# Patient Record
Sex: FEMALE | Race: OTHER | HISPANIC OR LATINO | URBAN - METROPOLITAN AREA
[De-identification: names, ages, dates, MRNs, and addresses within clinical notes are randomized per-mention and may not be internally consistent; named-entity substitution may affect disease eponyms.]

---

## 2023-09-21 ENCOUNTER — ANESTHESIA EVENT (OUTPATIENT)
Dept: SURGERY | Facility: HOSPITAL | Age: 43
End: 2023-09-21

## 2023-09-21 ENCOUNTER — HOSPITAL ENCOUNTER (OUTPATIENT)
Facility: HOSPITAL | Age: 43
Discharge: HOME | End: 2023-09-21
Attending: SURGERY | Admitting: SURGERY

## 2023-09-21 ENCOUNTER — ANESTHESIA (OUTPATIENT)
Dept: SURGERY | Facility: HOSPITAL | Age: 43
End: 2023-09-21

## 2023-09-21 VITALS
TEMPERATURE: 99 F | RESPIRATION RATE: 17 BRPM | WEIGHT: 162.06 LBS | SYSTOLIC BLOOD PRESSURE: 102 MMHG | OXYGEN SATURATION: 99 % | HEART RATE: 67 BPM | HEIGHT: 64 IN | DIASTOLIC BLOOD PRESSURE: 65 MMHG | BODY MASS INDEX: 27.67 KG/M2

## 2023-09-21 DIAGNOSIS — K35.80 ACUTE APPENDICITIS, UNSPECIFIED ACUTE APPENDICITIS TYPE: Primary | ICD-10-CM

## 2023-09-21 LAB
ALBUMIN SERPL BCP-MCNC: 4 G/DL (ref 3.5–5.2)
ALP SERPL-CCNC: 47 U/L (ref 55–135)
ALT SERPL W/O P-5'-P-CCNC: 20 U/L (ref 10–44)
ANION GAP SERPL CALC-SCNC: 13 MMOL/L (ref 8–16)
AST SERPL-CCNC: 18 U/L (ref 10–40)
B-HCG UR QL: NEGATIVE
BACTERIA #/AREA URNS HPF: NORMAL /HPF
BASOPHILS # BLD AUTO: 0.08 K/UL (ref 0–0.2)
BASOPHILS NFR BLD: 0.5 % (ref 0–1.9)
BILIRUB SERPL-MCNC: 0.8 MG/DL (ref 0.1–1)
BILIRUB UR QL STRIP: NEGATIVE
BUN SERPL-MCNC: 14 MG/DL (ref 6–20)
CALCIUM SERPL-MCNC: 9.1 MG/DL (ref 8.7–10.5)
CHLORIDE SERPL-SCNC: 107 MMOL/L (ref 95–110)
CLARITY UR: CLEAR
CO2 SERPL-SCNC: 15 MMOL/L (ref 23–29)
COLOR UR: YELLOW
CREAT SERPL-MCNC: 0.8 MG/DL (ref 0.5–1.4)
DIFFERENTIAL METHOD: ABNORMAL
EOSINOPHIL # BLD AUTO: 0.4 K/UL (ref 0–0.5)
EOSINOPHIL NFR BLD: 2.5 % (ref 0–8)
ERYTHROCYTE [DISTWIDTH] IN BLOOD BY AUTOMATED COUNT: 12.8 % (ref 11.5–14.5)
EST. GFR  (NO RACE VARIABLE): >60 ML/MIN/1.73 M^2
GLUCOSE SERPL-MCNC: 114 MG/DL (ref 70–110)
GLUCOSE UR QL STRIP: NEGATIVE
HCT VFR BLD AUTO: 38.6 % (ref 37–48.5)
HGB BLD-MCNC: 13.1 G/DL (ref 12–16)
HGB UR QL STRIP: ABNORMAL
IMM GRANULOCYTES # BLD AUTO: 0.06 K/UL (ref 0–0.04)
IMM GRANULOCYTES NFR BLD AUTO: 0.4 % (ref 0–0.5)
KETONES UR QL STRIP: ABNORMAL
LACTATE SERPL-SCNC: 0.9 MMOL/L (ref 0.5–2.2)
LEUKOCYTE ESTERASE UR QL STRIP: ABNORMAL
LIPASE SERPL-CCNC: 16 U/L (ref 4–60)
LYMPHOCYTES # BLD AUTO: 1.8 K/UL (ref 1–4.8)
LYMPHOCYTES NFR BLD: 12.3 % (ref 18–48)
MCH RBC QN AUTO: 29.4 PG (ref 27–31)
MCHC RBC AUTO-ENTMCNC: 33.9 G/DL (ref 32–36)
MCV RBC AUTO: 87 FL (ref 82–98)
MICROSCOPIC COMMENT: NORMAL
MONOCYTES # BLD AUTO: 1.3 K/UL (ref 0.3–1)
MONOCYTES NFR BLD: 8.6 % (ref 4–15)
NEUTROPHILS # BLD AUTO: 11.3 K/UL (ref 1.8–7.7)
NEUTROPHILS NFR BLD: 75.7 % (ref 38–73)
NITRITE UR QL STRIP: NEGATIVE
NRBC BLD-RTO: 0 /100 WBC
PH UR STRIP: 6 [PH] (ref 5–8)
PLATELET # BLD AUTO: 208 K/UL (ref 150–450)
PMV BLD AUTO: 10.8 FL (ref 9.2–12.9)
POTASSIUM SERPL-SCNC: 3.6 MMOL/L (ref 3.5–5.1)
PROT SERPL-MCNC: 7.2 G/DL (ref 6–8.4)
PROT UR QL STRIP: NEGATIVE
RBC # BLD AUTO: 4.46 M/UL (ref 4–5.4)
RBC #/AREA URNS HPF: 2 /HPF (ref 0–4)
SODIUM SERPL-SCNC: 135 MMOL/L (ref 136–145)
SP GR UR STRIP: >=1.03 (ref 1–1.03)
SQUAMOUS #/AREA URNS HPF: 1 /HPF
URN SPEC COLLECT METH UR: ABNORMAL
UROBILINOGEN UR STRIP-ACNC: NEGATIVE EU/DL
WBC # BLD AUTO: 14.96 K/UL (ref 3.9–12.7)
WBC #/AREA URNS HPF: 2 /HPF (ref 0–5)

## 2023-09-21 PROCEDURE — 27201423 OPTIME MED/SURG SUP & DEVICES STERILE SUPPLY: Performed by: SURGERY

## 2023-09-21 PROCEDURE — 44970 LAPAROSCOPY APPENDECTOMY: CPT | Mod: ,,, | Performed by: SURGERY

## 2023-09-21 PROCEDURE — 36000708 HC OR TIME LEV III 1ST 15 MIN: Performed by: SURGERY

## 2023-09-21 PROCEDURE — 63600175 PHARM REV CODE 636 W HCPCS: Performed by: SURGERY

## 2023-09-21 PROCEDURE — G0378 HOSPITAL OBSERVATION PER HR: HCPCS

## 2023-09-21 PROCEDURE — 88304 PR  SURG PATH,LEVEL III: ICD-10-PCS | Mod: 26,,, | Performed by: PATHOLOGY

## 2023-09-21 PROCEDURE — 37000009 HC ANESTHESIA EA ADD 15 MINS: Performed by: SURGERY

## 2023-09-21 PROCEDURE — 25000003 PHARM REV CODE 250: Performed by: SURGERY

## 2023-09-21 PROCEDURE — 83690 ASSAY OF LIPASE: CPT | Performed by: SURGERY

## 2023-09-21 PROCEDURE — 36000709 HC OR TIME LEV III EA ADD 15 MIN: Performed by: SURGERY

## 2023-09-21 PROCEDURE — 63600175 PHARM REV CODE 636 W HCPCS

## 2023-09-21 PROCEDURE — 71000033 HC RECOVERY, INTIAL HOUR: Performed by: SURGERY

## 2023-09-21 PROCEDURE — 37000008 HC ANESTHESIA 1ST 15 MINUTES: Performed by: SURGERY

## 2023-09-21 PROCEDURE — 25000003 PHARM REV CODE 250

## 2023-09-21 PROCEDURE — 99285 EMERGENCY DEPT VISIT HI MDM: CPT | Mod: 25

## 2023-09-21 PROCEDURE — 81025 URINE PREGNANCY TEST: CPT | Performed by: SURGERY

## 2023-09-21 PROCEDURE — 36415 COLL VENOUS BLD VENIPUNCTURE: CPT | Performed by: SURGERY

## 2023-09-21 PROCEDURE — 83605 ASSAY OF LACTIC ACID: CPT | Performed by: SURGERY

## 2023-09-21 PROCEDURE — 96376 TX/PRO/DX INJ SAME DRUG ADON: CPT

## 2023-09-21 PROCEDURE — 88304 TISSUE EXAM BY PATHOLOGIST: CPT | Mod: 26,,, | Performed by: PATHOLOGY

## 2023-09-21 PROCEDURE — 00840 ANES IPER PX LOWER ABD NOS: CPT | Mod: QZ | Performed by: NURSE ANESTHETIST, CERTIFIED REGISTERED

## 2023-09-21 PROCEDURE — 99223 PR INITIAL HOSPITAL CARE,LEVL III: ICD-10-PCS | Mod: 25,,, | Performed by: SURGERY

## 2023-09-21 PROCEDURE — 88304 TISSUE EXAM BY PATHOLOGIST: CPT | Performed by: PATHOLOGY

## 2023-09-21 PROCEDURE — 85025 COMPLETE CBC W/AUTO DIFF WBC: CPT | Performed by: SURGERY

## 2023-09-21 PROCEDURE — 99223 1ST HOSP IP/OBS HIGH 75: CPT | Mod: 25,,, | Performed by: SURGERY

## 2023-09-21 PROCEDURE — 96375 TX/PRO/DX INJ NEW DRUG ADDON: CPT

## 2023-09-21 PROCEDURE — 25500020 PHARM REV CODE 255: Performed by: SURGERY

## 2023-09-21 PROCEDURE — 87040 BLOOD CULTURE FOR BACTERIA: CPT | Mod: 59 | Performed by: SURGERY

## 2023-09-21 PROCEDURE — 81000 URINALYSIS NONAUTO W/SCOPE: CPT | Performed by: SURGERY

## 2023-09-21 PROCEDURE — 96365 THER/PROPH/DIAG IV INF INIT: CPT

## 2023-09-21 PROCEDURE — 96361 HYDRATE IV INFUSION ADD-ON: CPT

## 2023-09-21 PROCEDURE — 80053 COMPREHEN METABOLIC PANEL: CPT | Performed by: SURGERY

## 2023-09-21 PROCEDURE — D9220AH HC ANESTHESIA PROFESSIONAL FEE: Mod: QZ | Performed by: NURSE ANESTHETIST, CERTIFIED REGISTERED

## 2023-09-21 PROCEDURE — 96366 THER/PROPH/DIAG IV INF ADDON: CPT

## 2023-09-21 PROCEDURE — 44970 PR LAP,APPENDECTOMY: ICD-10-PCS | Mod: ,,, | Performed by: SURGERY

## 2023-09-21 RX ORDER — HYDROCODONE BITARTRATE AND ACETAMINOPHEN 5; 325 MG/1; MG/1
1 TABLET ORAL EVERY 4 HOURS PRN
Qty: 10 TABLET | Refills: 0 | Status: SHIPPED | OUTPATIENT
Start: 2023-09-21

## 2023-09-21 RX ORDER — SODIUM CHLORIDE 9 MG/ML
1000 INJECTION, SOLUTION INTRAVENOUS CONTINUOUS
Status: DISCONTINUED | OUTPATIENT
Start: 2023-09-21 | End: 2023-09-21 | Stop reason: HOSPADM

## 2023-09-21 RX ORDER — ONDANSETRON 2 MG/ML
4 INJECTION INTRAMUSCULAR; INTRAVENOUS
Status: COMPLETED | OUTPATIENT
Start: 2023-09-21 | End: 2023-09-21

## 2023-09-21 RX ORDER — KETOROLAC TROMETHAMINE 30 MG/ML
INJECTION, SOLUTION INTRAMUSCULAR; INTRAVENOUS
Status: DISCONTINUED | OUTPATIENT
Start: 2023-09-21 | End: 2023-09-21

## 2023-09-21 RX ORDER — ROCURONIUM BROMIDE 10 MG/ML
INJECTION, SOLUTION INTRAVENOUS
Status: DISCONTINUED | OUTPATIENT
Start: 2023-09-21 | End: 2023-09-21

## 2023-09-21 RX ORDER — IBUPROFEN 600 MG/1
600 TABLET ORAL EVERY 6 HOURS PRN
Status: DISCONTINUED | OUTPATIENT
Start: 2023-09-21 | End: 2023-09-21 | Stop reason: HOSPADM

## 2023-09-21 RX ORDER — MORPHINE SULFATE 2 MG/ML
1 INJECTION, SOLUTION INTRAMUSCULAR; INTRAVENOUS EVERY 4 HOURS PRN
Status: CANCELLED | OUTPATIENT
Start: 2023-09-21

## 2023-09-21 RX ORDER — ACETAMINOPHEN 325 MG/1
650 TABLET ORAL EVERY 8 HOURS PRN
Status: DISCONTINUED | OUTPATIENT
Start: 2023-09-21 | End: 2023-09-21 | Stop reason: HOSPADM

## 2023-09-21 RX ORDER — LIDOCAINE HYDROCHLORIDE 20 MG/ML
INJECTION, SOLUTION EPIDURAL; INFILTRATION; INTRACAUDAL; PERINEURAL
Status: DISCONTINUED | OUTPATIENT
Start: 2023-09-21 | End: 2023-09-21

## 2023-09-21 RX ORDER — DEXAMETHASONE SODIUM PHOSPHATE 4 MG/ML
INJECTION, SOLUTION INTRA-ARTICULAR; INTRALESIONAL; INTRAMUSCULAR; INTRAVENOUS; SOFT TISSUE
Status: DISCONTINUED | OUTPATIENT
Start: 2023-09-21 | End: 2023-09-21

## 2023-09-21 RX ORDER — BUPIVACAINE HYDROCHLORIDE AND EPINEPHRINE 5; 5 MG/ML; UG/ML
INJECTION, SOLUTION EPIDURAL; INTRACAUDAL; PERINEURAL
Status: DISCONTINUED | OUTPATIENT
Start: 2023-09-21 | End: 2023-09-21 | Stop reason: HOSPADM

## 2023-09-21 RX ORDER — HYDROCODONE BITARTRATE AND ACETAMINOPHEN 5; 325 MG/1; MG/1
1 TABLET ORAL EVERY 4 HOURS PRN
Status: DISCONTINUED | OUTPATIENT
Start: 2023-09-21 | End: 2023-09-21 | Stop reason: HOSPADM

## 2023-09-21 RX ORDER — KETOROLAC TROMETHAMINE 30 MG/ML
30 INJECTION, SOLUTION INTRAMUSCULAR; INTRAVENOUS
Status: COMPLETED | OUTPATIENT
Start: 2023-09-21 | End: 2023-09-21

## 2023-09-21 RX ORDER — MIDAZOLAM HYDROCHLORIDE 1 MG/ML
INJECTION INTRAMUSCULAR; INTRAVENOUS
Status: DISCONTINUED | OUTPATIENT
Start: 2023-09-21 | End: 2023-09-21

## 2023-09-21 RX ORDER — TALC
6 POWDER (GRAM) TOPICAL NIGHTLY PRN
Status: DISCONTINUED | OUTPATIENT
Start: 2023-09-21 | End: 2023-09-21 | Stop reason: HOSPADM

## 2023-09-21 RX ORDER — ACETAMINOPHEN 10 MG/ML
INJECTION, SOLUTION INTRAVENOUS
Status: DISCONTINUED | OUTPATIENT
Start: 2023-09-21 | End: 2023-09-21

## 2023-09-21 RX ORDER — HYDROCODONE BITARTRATE AND ACETAMINOPHEN 5; 325 MG/1; MG/1
1 TABLET ORAL EVERY 4 HOURS PRN
Status: CANCELLED | OUTPATIENT
Start: 2023-09-21

## 2023-09-21 RX ORDER — SODIUM CHLORIDE 9 MG/ML
INJECTION, SOLUTION INTRAVENOUS CONTINUOUS
Status: DISCONTINUED | OUTPATIENT
Start: 2023-09-21 | End: 2023-09-21 | Stop reason: HOSPADM

## 2023-09-21 RX ORDER — SODIUM CHLORIDE 0.9 % (FLUSH) 0.9 %
10 SYRINGE (ML) INJECTION
Status: DISCONTINUED | OUTPATIENT
Start: 2023-09-21 | End: 2023-09-21 | Stop reason: HOSPADM

## 2023-09-21 RX ORDER — SODIUM CHLORIDE, SODIUM LACTATE, POTASSIUM CHLORIDE, CALCIUM CHLORIDE 600; 310; 30; 20 MG/100ML; MG/100ML; MG/100ML; MG/100ML
INJECTION, SOLUTION INTRAVENOUS CONTINUOUS PRN
Status: DISCONTINUED | OUTPATIENT
Start: 2023-09-21 | End: 2023-09-21

## 2023-09-21 RX ORDER — ONDANSETRON 2 MG/ML
4 INJECTION INTRAMUSCULAR; INTRAVENOUS EVERY 12 HOURS PRN
Status: CANCELLED | OUTPATIENT
Start: 2023-09-21

## 2023-09-21 RX ORDER — FENTANYL CITRATE 50 UG/ML
INJECTION, SOLUTION INTRAMUSCULAR; INTRAVENOUS
Status: DISCONTINUED | OUTPATIENT
Start: 2023-09-21 | End: 2023-09-21

## 2023-09-21 RX ORDER — SUCCINYLCHOLINE CHLORIDE 20 MG/ML
INJECTION INTRAMUSCULAR; INTRAVENOUS
Status: DISCONTINUED | OUTPATIENT
Start: 2023-09-21 | End: 2023-09-21

## 2023-09-21 RX ORDER — ONDANSETRON 2 MG/ML
4 INJECTION INTRAMUSCULAR; INTRAVENOUS EVERY 8 HOURS PRN
Status: DISCONTINUED | OUTPATIENT
Start: 2023-09-21 | End: 2023-09-21 | Stop reason: HOSPADM

## 2023-09-21 RX ORDER — PROPOFOL 10 MG/ML
VIAL (ML) INTRAVENOUS
Status: DISCONTINUED | OUTPATIENT
Start: 2023-09-21 | End: 2023-09-21

## 2023-09-21 RX ORDER — PHENYLEPHRINE HYDROCHLORIDE 10 MG/ML
INJECTION INTRAVENOUS
Status: DISCONTINUED | OUTPATIENT
Start: 2023-09-21 | End: 2023-09-21

## 2023-09-21 RX ADMIN — SODIUM CHLORIDE 1000 ML: 9 INJECTION, SOLUTION INTRAVENOUS at 04:09

## 2023-09-21 RX ADMIN — KETOROLAC TROMETHAMINE 30 MG: 30 INJECTION, SOLUTION INTRAMUSCULAR at 12:09

## 2023-09-21 RX ADMIN — CEFOXITIN 2 G: 2 INJECTION, POWDER, FOR SOLUTION INTRAVENOUS at 04:09

## 2023-09-21 RX ADMIN — FENTANYL CITRATE 50 MCG: 0.05 INJECTION, SOLUTION INTRAMUSCULAR; INTRAVENOUS at 12:09

## 2023-09-21 RX ADMIN — SODIUM CHLORIDE 2000 ML: 9 INJECTION, SOLUTION INTRAVENOUS at 03:09

## 2023-09-21 RX ADMIN — SODIUM CHLORIDE: 9 INJECTION, SOLUTION INTRAVENOUS at 03:09

## 2023-09-21 RX ADMIN — ACETAMINOPHEN 1000 MG: 10 INJECTION, SOLUTION INTRAVENOUS at 11:09

## 2023-09-21 RX ADMIN — SODIUM CHLORIDE 1000 ML: 9 INJECTION, SOLUTION INTRAVENOUS at 01:09

## 2023-09-21 RX ADMIN — ROCURONIUM BROMIDE 5 MG: 10 INJECTION, SOLUTION INTRAVENOUS at 11:09

## 2023-09-21 RX ADMIN — SUGAMMADEX 200 MG: 100 INJECTION, SOLUTION INTRAVENOUS at 12:09

## 2023-09-21 RX ADMIN — DEXAMETHASONE SODIUM PHOSPHATE 8 MG: 4 INJECTION, SOLUTION INTRAMUSCULAR; INTRAVENOUS at 11:09

## 2023-09-21 RX ADMIN — GLYCOPYRROLATE 0.2 MG: 0.2 INJECTION, SOLUTION INTRAMUSCULAR; INTRAVENOUS at 11:09

## 2023-09-21 RX ADMIN — PHENYLEPHRINE HYDROCHLORIDE 200 MCG: 10 INJECTION INTRAVENOUS at 11:09

## 2023-09-21 RX ADMIN — FENTANYL CITRATE 50 MCG: 0.05 INJECTION, SOLUTION INTRAMUSCULAR; INTRAVENOUS at 11:09

## 2023-09-21 RX ADMIN — ROCURONIUM BROMIDE 35 MG: 10 INJECTION, SOLUTION INTRAVENOUS at 11:09

## 2023-09-21 RX ADMIN — KETOROLAC TROMETHAMINE 30 MG: 30 INJECTION, SOLUTION INTRAMUSCULAR; INTRAVENOUS at 01:09

## 2023-09-21 RX ADMIN — ONDANSETRON 8 MG: 2 INJECTION, SOLUTION INTRAMUSCULAR; INTRAVENOUS at 11:09

## 2023-09-21 RX ADMIN — PROPOFOL 120 MG: 10 INJECTION, EMULSION INTRAVENOUS at 11:09

## 2023-09-21 RX ADMIN — SUCCINYLCHOLINE CHLORIDE 120 MG: 20 INJECTION, SOLUTION INTRAMUSCULAR; INTRAVENOUS at 11:09

## 2023-09-21 RX ADMIN — MIDAZOLAM HYDROCHLORIDE 2 MG: 1 INJECTION, SOLUTION INTRAMUSCULAR; INTRAVENOUS at 11:09

## 2023-09-21 RX ADMIN — ONDANSETRON HYDROCHLORIDE 4 MG: 2 SOLUTION INTRAMUSCULAR; INTRAVENOUS at 12:09

## 2023-09-21 RX ADMIN — SODIUM CHLORIDE 1000 ML: 9 INJECTION, SOLUTION INTRAVENOUS at 12:09

## 2023-09-21 RX ADMIN — IOHEXOL 75 ML: 350 INJECTION, SOLUTION INTRAVENOUS at 02:09

## 2023-09-21 RX ADMIN — SODIUM CHLORIDE: 9 INJECTION, SOLUTION INTRAVENOUS at 06:09

## 2023-09-21 RX ADMIN — ONDANSETRON 4 MG: 2 INJECTION INTRAMUSCULAR; INTRAVENOUS at 01:09

## 2023-09-21 RX ADMIN — CEFOXITIN 2 G: 2 INJECTION, POWDER, FOR SOLUTION INTRAVENOUS at 09:09

## 2023-09-21 RX ADMIN — LIDOCAINE HYDROCHLORIDE 100 MG: 20 INJECTION, SOLUTION EPIDURAL; INFILTRATION; INTRACAUDAL; PERINEURAL at 11:09

## 2023-09-21 RX ADMIN — SODIUM CHLORIDE, SODIUM LACTATE, POTASSIUM CHLORIDE, AND CALCIUM CHLORIDE: .6; .31; .03; .02 INJECTION, SOLUTION INTRAVENOUS at 11:09

## 2023-09-21 NOTE — TRANSFER OF CARE
"Anesthesia Transfer of Care Note    Patient: Loulou Galvez    Procedure(s) Performed: Procedure(s) (LRB):  APPENDECTOMY, LAPAROSCOPIC (N/A)    Patient location: PACU    Anesthesia Type: general    Transport from OR: Transported from OR on 6-10 L/min O2 by face mask with adequate spontaneous ventilation    Post pain: adequate analgesia    Post assessment: no apparent anesthetic complications and tolerated procedure well    Post vital signs: stable    Level of consciousness: sedated    Nausea/Vomiting: no nausea/vomiting    Complications: none    Transfer of care protocol was followed      Last vitals:   Visit Vitals  BP 99/62 (BP Location: Left arm, Patient Position: Lying)   Pulse 72   Temp 36.2 °C (97.2 °F) (Skin)   Resp 18   Ht 5' 4" (1.626 m)   Wt 73.5 kg (162 lb 0.6 oz)   LMP  (LMP Unknown)   SpO2 99%   Breastfeeding No   BMI 27.81 kg/m²     "

## 2023-09-21 NOTE — ED NOTES
Pt noted to be hypotensive upon entering room. Pt denies dizziness and states that her pain is much better. MD notified and at bedside.

## 2023-09-21 NOTE — ANESTHESIA POSTPROCEDURE EVALUATION
Anesthesia Post Evaluation    Patient: Loulou Galvez    Procedure(s) Performed: Procedure(s) (LRB):  APPENDECTOMY, LAPAROSCOPIC (N/A)    Final Anesthesia Type: general      Patient location during evaluation: PACU  Patient participation: Yes- Able to Participate  Level of consciousness: awake and alert and oriented  Post-procedure vital signs: reviewed and stable  Pain management: adequate  Airway patency: patent    PONV status at discharge: No PONV  Anesthetic complications: no      Cardiovascular status: blood pressure returned to baseline and hemodynamically stable  Respiratory status: unassisted, spontaneous ventilation and room air  Hydration status: euvolemic  Follow-up not needed.          Vitals Value Taken Time   BP 94/58 09/21/23 1327   Temp 37.1 °C (98.8 °F) 09/21/23 1327   Pulse 68 09/21/23 1327   Resp 18 09/21/23 1327   SpO2 96 % 09/21/23 1327         Event Time   Out of Recovery 13:14:05         Pain/Uzma Score: Pain Rating Prior to Med Admin: 10 (9/21/2023  1:58 AM)  Pain Rating Post Med Admin: 3 (9/21/2023  2:28 AM)

## 2023-09-21 NOTE — H&P
History and physical Note    Consult Requested by:  er  Reason for Consult:  Appendicitis  SUBJECTIVE:     History of Present Illness:  Patient is a 43 y.o. female presents with right lower quadrant pain for a day.  Workup revealed elevated white count CT scan showed an acute early appendicitis without perforation or complications.  Review of patient's allergies indicates:  No Known Allergies    No past medical history on file.  No past surgical history on file.  No family history on file.       Review of Systems:  Noncontributory    OBJECTIVE:     Vital Signs:  Temp:  [98.1 °F (36.7 °C)-98.7 °F (37.1 °C)]   Pulse:  [62-90]   Resp:  [16-18]   BP: ()/(52-76)   SpO2:  [98 %-100 %]     Physical Exam:  Patient is awake alert oriented x3.  Speaks Monegasque but   does have rudimentary English ability.  HEENT normal lungs clear heart regular rhythm abdomen is soft tender in right lower quadrant but no guarding no rebound.    Laboratory:  Labs reviewed mildly elevated white count pregnancy test negative.    Diagnostic Results:  Reviewed  CT scan shows early acute uncomplicated appendicitis.  ASSESSMENT/PLAN:   Impression acute appendicitis    Plan:  Laparoscopic or open appendectomy.  Patient on antibiotics understands risks and complications through an  we will signed consents were taken the operating room later today.

## 2023-09-21 NOTE — NURSING
Patient transported to 3rd floor room 301. NADN. Care assumed after report received ED, RN. Patient is AAOx4. No complaints of chest pain or any other discomforts. Patient oriented to room, bed in low position, side rails up x2, call bell in reach.

## 2023-09-21 NOTE — ED PROVIDER NOTES
Encounter Date: 9/21/2023       History     Chief Complaint   Patient presents with    Abdominal Pain     Pt to ED via [facility] services for lower abdominal pain and nausea that began at 5 pm. Denies any vomiting or diarrhea.      Loulou Galvez is a 43 y.o. female that presents with 12 hours of abdominal pain  Lower abdominal pain since 5:00 p.m. yesterday, persistent since onset in p.m.  Sharp pain, distinctly localizing to the right lower quadrant on my examination  No signs of rebound, no signs of guarding with tenderness at McBurney's point  She does have a scar on her abdomen but states that is from past childbirths    The history is provided by the patient. The history is limited by a language barrier. A  was used.     Review of patient's allergies indicates:  No Known Allergies    No past medical history on file.  No past surgical history on file.  No family history on file.       Review of Systems     Review of Systems   Constitutional: Negative.    HENT: Negative.     Eyes: Negative.    Respiratory: Negative.     Cardiovascular: Negative.    Gastrointestinal:  Positive for abdominal pain and nausea.   Genitourinary: Negative.    Musculoskeletal: Negative.    Skin: Negative.    Neurological: Negative.    Psychiatric/Behavioral: Negative.         Physical Exam     Initial Vitals [09/21/23 0004]   BP Pulse Resp Temp SpO2   118/62 90 18 98.3 °F (36.8 °C) 100 %      MAP       --         Physical Exam    Nursing note and vitals reviewed.  Constitutional: Vital signs are normal. She appears well-developed and well-nourished. She is cooperative.   HENT:   Head: Normocephalic and atraumatic.   Eyes: Conjunctivae, EOM and lids are normal. Pupils are equal, round, and reactive to light.   Neck: Trachea normal and phonation normal. Neck supple. No JVD present.   Normal range of motion.   Full passive range of motion without pain.     Cardiovascular:  Normal rate, regular rhythm, S1  normal, S2 normal, normal heart sounds, intact distal pulses and normal pulses.           Pulmonary/Chest: Effort normal and breath sounds normal.   Abdominal: Abdomen is flat.   (+) tenderness in the right lower quadrant, normal bowel sounds   Musculoskeletal:         General: Normal range of motion.      Cervical back: Full passive range of motion without pain, normal range of motion and neck supple.     Neurological: She is alert and oriented to person, place, and time. She has normal strength.   Skin: Skin is warm, dry and intact. Capillary refill takes less than 2 seconds.       Lab Values     Labs Reviewed   COMPREHENSIVE METABOLIC PANEL - Abnormal; Notable for the following components:       Result Value    Sodium 135 (*)     CO2 15 (*)     Glucose 114 (*)     Alkaline Phosphatase 47 (*)     All other components within normal limits   CBC W/ AUTO DIFFERENTIAL - Abnormal; Notable for the following components:    WBC 14.96 (*)     Gran # (ANC) 11.3 (*)     Immature Grans (Abs) 0.06 (*)     Mono # 1.3 (*)     Gran % 75.7 (*)     Lymph % 12.3 (*)     All other components within normal limits   URINALYSIS, REFLEX TO URINE CULTURE - Abnormal; Notable for the following components:    Specific Gravity, UA >=1.030 (*)     Ketones, UA 2+ (*)     Occult Blood UA 1+ (*)     Leukocytes, UA 1+ (*)     All other components within normal limits    Narrative:     Specimen Source->Urine   CULTURE, BLOOD   CULTURE, BLOOD   LIPASE   PREGNANCY TEST, URINE RAPID    Narrative:     Specimen Source->Urine   URINALYSIS MICROSCOPIC    Narrative:     Specimen Source->Urine   LACTIC ACID, PLASMA     Imaging      Imaging Results              CT Abdomen Pelvis With Contrast (In process)                     ED Medications     Medications   ceFOXItin (MEFOXIN) 2 g in dextrose 5 % in water (D5W) 100 mL IVPB (MB+) (0 g Intravenous Stopped 9/21/23 7909)   sodium chloride 0.9% bolus 1,000 mL 1,000 mL (1,000 mLs Intravenous New Bag 9/21/23 2677)    sodium chloride 0.9% bolus 1,000 mL 1,000 mL (0 mLs Intravenous Stopped 9/21/23 0120)   ondansetron injection 4 mg (4 mg Intravenous Given 9/21/23 0021)   iohexoL (OMNIPAQUE 350) injection 75 mL (75 mLs Intravenous Given 9/21/23 0220)   ondansetron injection 4 mg (4 mg Intravenous Given 9/21/23 0158)   ketorolac injection 30 mg (30 mg Intravenous Given 9/21/23 0158)   sodium chloride 0.9% bolus 1,000 mL 1,000 mL (0 mLs Intravenous Stopped 9/21/23 0258)   sodium chloride 0.9% bolus 2,000 mL 2,000 mL (2,000 mLs Intravenous New Bag 9/21/23 0347)       MDM     Medical Decision Making  Right lower quadrant pain since 5:00 p.m. yesterday, progressive   Patient with no fever, no signs of acute diffuse peritonitis on exam    Differential Diagnosis  Enteritis, gastroenteritis, UTI, kidney stone, diverticulitis  Colitis, bowel obstruction, pyelonephritis, gyn related pain    Problems Addressed:  Acute appendicitis, unspecified acute appendicitis type: complicated acute illness or injury    Amount and/or Complexity of Data Reviewed  Labs: ordered. Decision-making details documented in ED Course.  Radiology: ordered and independent interpretation performed.    ED Management & Risk of Complications, Morbidity, Mortality:  14,000 WBC with a (-) lactic acid, lab work otherwise normal  CT scan shows an uncomplicated on perforated diverticulitis today  IV fluids given, IV Mefoxin given, discussed with Dr. Rodriguez this a.m.  NPO, admission, proceed with gregorio hernandez later today per Dr. Rodriguez    This patient has had a lower blood pressure on ER evaluation today  Lactic acid is normal, the patient does not seem septic/toxic on exam  We have no previous blood pressures to compare to, minimal pain now  No fever, have given IV boluses with stable blood pressure in the ER   I discussed the blood pressure with Dr. Rodriguez, probably typically low  We both agree that the patient probably runs a low blood pressure  Will continue IV fluids on the  floor with BP monitoring going forward  The patient seems clearly stable from our standpoint with no PM Hx      Clinical Impression:   Final diagnoses:  [K35.80] Acute appendicitis, unspecified acute appendicitis type (Primary)        ED Disposition Condition    Observation Stable                Rojelio Dubois MD  09/21/23 0514

## 2023-09-21 NOTE — PLAN OF CARE
[facility] - Med Surg (3rd Fl)  Discharge Assessment    Primary Care Provider: Maria Luz Barrera NP     Discharge Assessment (most recent)       BRIEF DISCHARGE ASSESSMENT - 09/21/23 0941          Discharge Planning    Assessment Type Discharge Planning Brief Assessment (P)      Resource/Environmental Concerns financial (P)      Financial Concerns insurance, none (P)      Current Living Arrangements home (P)      Patient/Family Anticipates Transition to home (P)      DME Needed Upon Discharge  none (P)      Discharge Plan A Home (P)      Discharge Plan B Home (P)                      Discharge assessment complete from the medical record as patient will have surgery today. Medicaid representative contacted to speak to patient as she is self pay.

## 2023-09-21 NOTE — OP NOTE
[facility] - [facility] Surg ([facility])  Op Note    SUMMARY     Surgery Date: 9/21/2023     Surgeon(s) and Role:     * Julio Rodriguez MD - Primary    Assisting Surgeon: None    Pre-op Diagnosis:  Acute appendicitis, unspecified acute appendicitis type [K35.80]    Post-op Diagnosis:  Post-Op Diagnosis Codes:     * Acute appendicitis, unspecified acute appendicitis type [K35.80]    Procedure(s) (LRB):  APPENDECTOMY, LAPAROSCOPIC (N/A)    The patient was prepped and draped in a sterile fashion.  CO2 was insufflated via Veress needle at the umbilicus.  Five port was placed camera was introduced examination revealed acute appendicitis.  Eleven port was placed in lower midline under direct vision the 5 in the right lower quadrant.  The appendix was grasped and dissected out from surrounding adhesions.  A window was made at the base of the appendix and stapling device was fired across the base of the appendix with the blue load.  Mesentery was then taken down and fired across with a stapler with a white load.  Irrigation hemostasis obtained and confirmed.  The appendix was placed into a retrieval bag and removed through the 11 port site.  Again hemostasis was confirmed CO2 the abdomen all ports removed all skin sites were closed with undyed Monocryl Dermabond.  I then port site was closed with a Vicryl suture using a suture Passer.  Blood loss minimal counts were correct    Anesthesia: General    Estimated Blood Loss: * No values recorded between 9/21/2023 12:00 AM and 9/21/2023 11:15 AM *         Specimens:   Specimen (24h ago, onward)      None

## 2023-09-21 NOTE — PROGRESS NOTES
Pharmacist Renal Dose Adjustment Note    Loulou Galvez is a 43 y.o. female being treated with the medication Cefoxitin    Patient Data:    Vital Signs (Most Recent):  Temp: 98.7 °F (37.1 °C) (09/21/23 0740)  Pulse: 73 (09/21/23 0800)  Resp: 18 (09/21/23 0740)  BP: (!) 85/54 (09/21/23 0740)  SpO2: 99 % (09/21/23 0740) Vital Signs (72h Range):  Temp:  [98.1 °F (36.7 °C)-98.7 °F (37.1 °C)]   Pulse:  [62-90]   Resp:  [16-18]   BP: ()/(52-76)   SpO2:  [98 %-100 %]      Recent Labs   Lab 09/21/23  0019   CREATININE 0.8     Serum creatinine: 0.8 mg/dL 09/21/23 0019  Estimated creatinine clearance: 88.1 mL/min    Medication: Cefoxitin dose:  2 gram frequency  Q8H will be changed to medication: Cefoxitin dose: 2 gram frequency: Q6H due to crcl > 50 ml/min    Pharmacist's Name: Abi Hanna  Pharmacist's Extension: [phone]

## 2023-09-21 NOTE — NURSING
Patient  discharged instructions, education and teaching provided with  Hardeep ID [id number]. Patient allowed time to ask questions at this time. We discussed wound care and cover small area with gauze bandage from post-op drainage. Reviewed post-op  deep breathing exercises. Patient acknowledged understanding of her discharge care, F/U and RX for Norco given patient to present to any pharmacy.

## 2023-09-21 NOTE — ANESTHESIA POSTPROCEDURE EVALUATION
Anesthesia Post Evaluation    Patient: Loulou Galvez    Procedure(s) Performed: Procedure(s) (LRB):  APPENDECTOMY, LAPAROSCOPIC (N/A)    [facility] Anesthesia Post Op Evaluation      Vitals Value Taken Time   BP 88/51 09/21/23 1245   Temp 36.5 09/21/23 1245   Pulse 84 09/21/23 1245   Resp 16 09/21/23 1245   SpO2 99 09/21/23 1245         No case tracking events are documented in the log.      Pain/Uzma Score: Pain Rating Prior to Med Admin: 10 (9/21/2023  1:58 AM)  Pain Rating Post Med Admin: 3 (9/21/2023  2:28 AM)

## 2023-09-21 NOTE — ANESTHESIA PREPROCEDURE EVALUATION
09/21/2023  Loulou Galvez is a 43 y.o., female.      Pre-op Assessment    I have reviewed the Patient Summary Reports.     I have reviewed the Nursing Notes. I have reviewed the NPO Status.   I have reviewed the Medications.     Review of Systems  Anesthesia Hx:  No problems with previous Anesthesia  History of prior surgery of interest to airway management or planning:  Denies Personal Hx of Anesthesia complications.   Social:  Non-Smoker, No Alcohol Use    Hematology/Oncology:  Hematology Normal   Oncology Normal     EENT/Dental:EENT/Dental Normal   Cardiovascular:  Cardiovascular Normal Exercise tolerance: good     Pulmonary:  Pulmonary Normal    Renal/:  Renal/ Normal     Hepatic/GI:   Acute appendicitis   Musculoskeletal:  Musculoskeletal Normal    Neurological:  Neurology Normal    Endocrine:  Endocrine Normal    Dermatological:  Skin Normal    Psych:  Psychiatric Normal           Physical Exam  General: Well nourished, Cooperative, Alert and Oriented    Airway:  Mallampati: II   Mouth Opening: Normal  TM Distance: Normal  Tongue: Normal    Dental:  Intact        Anesthesia Plan  Type of Anesthesia, risks & benefits discussed:    Anesthesia Type: Gen ETT  Intra-op Monitoring Plan: Standard ASA Monitors  Post Op Pain Control Plan: multimodal analgesia and IV/PO Opioids PRN  Induction:  IV and rapid sequence  Airway Plan: Video, Post-Induction  Informed Consent: Informed consent signed with the Patient and all parties understand the risks and agree with anesthesia plan.  All questions answered. Patient consented to blood products? Yes  ASA Score: 2 Emergent  Day of Surgery Review of History & Physical: H&P Update referred to the surgeon/provider.I have interviewed and examined the patient. I have reviewed the patient's H&P dated: 9/21/23. There are no significant changes.   Anesthesia Plan Notes: [facility] [place]  video  service used for consent and anesthesia explanation, all questions and concerns addressed.   ID #[id number]    Ready For Surgery From Anesthesia Perspective.     .

## 2023-09-21 NOTE — ANESTHESIA PROCEDURE NOTES
Intubation    Date/Time: 9/21/2023 11:42 AM    Performed by: Brittaney Nicholas  Authorized by: Murtaza Watters, SHAMAR    Intubation:     Induction:  Intravenous    Intubated:  Postinduction    Mask Ventilation:  Easy mask    Attempts:  1    Attempted By:  Student    Method of Intubation:  Video laryngoscopy    Blade:  Lo 3    Laryngeal View Grade: Grade I - full view of cords      Difficult Airway Encountered?: No      Complications:  None    Airway Device:  Oral endotracheal tube    Airway Device Size:  7.5    Style/Cuff Inflation:  Cuffed    Inflation Amount (mL):  5    Tube secured:  21    Secured at:  The lips    Placement Verified By:  Capnometry    Complicating Factors:  None    Findings Post-Intubation:  BS equal bilateral and atraumatic/condition of teeth unchanged

## 2023-09-25 ENCOUNTER — HOSPITAL ENCOUNTER (EMERGENCY)
Facility: HOSPITAL | Age: 43
Discharge: HOME | End: 2023-09-25
Attending: SURGERY

## 2023-09-25 VITALS
BODY MASS INDEX: 26.04 KG/M2 | WEIGHT: 151.69 LBS | DIASTOLIC BLOOD PRESSURE: 64 MMHG | TEMPERATURE: 98 F | SYSTOLIC BLOOD PRESSURE: 104 MMHG | HEART RATE: 72 BPM | OXYGEN SATURATION: 100 % | RESPIRATION RATE: 16 BRPM

## 2023-09-25 DIAGNOSIS — K59.00 CONSTIPATION, UNSPECIFIED CONSTIPATION TYPE: ICD-10-CM

## 2023-09-25 DIAGNOSIS — R53.83 FATIGUE: ICD-10-CM

## 2023-09-25 DIAGNOSIS — R93.89 ABNORMAL CHEST X-RAY: ICD-10-CM

## 2023-09-25 DIAGNOSIS — E86.0 DEHYDRATION: Primary | ICD-10-CM

## 2023-09-25 LAB
ALBUMIN SERPL BCP-MCNC: 4 G/DL (ref 3.5–5.2)
ALP SERPL-CCNC: 54 U/L (ref 55–135)
ALT SERPL W/O P-5'-P-CCNC: 19 U/L (ref 10–44)
ANION GAP SERPL CALC-SCNC: 9 MMOL/L (ref 8–16)
AST SERPL-CCNC: 16 U/L (ref 10–40)
BACTERIA #/AREA URNS HPF: ABNORMAL /HPF
BASOPHILS # BLD AUTO: 0.08 K/UL (ref 0–0.2)
BASOPHILS NFR BLD: 0.8 % (ref 0–1.9)
BILIRUB SERPL-MCNC: 0.4 MG/DL (ref 0.1–1)
BILIRUB UR QL STRIP: NEGATIVE
BNP SERPL-MCNC: <10 PG/ML (ref 0–99)
BUN SERPL-MCNC: 13 MG/DL (ref 6–20)
CALCIUM SERPL-MCNC: 10.3 MG/DL (ref 8.7–10.5)
CAOX CRY URNS QL MICRO: ABNORMAL
CHLORIDE SERPL-SCNC: 103 MMOL/L (ref 95–110)
CK SERPL-CCNC: 42 U/L (ref 20–180)
CLARITY UR: CLEAR
CO2 SERPL-SCNC: 26 MMOL/L (ref 23–29)
COLOR UR: YELLOW
CREAT SERPL-MCNC: 0.8 MG/DL (ref 0.5–1.4)
D DIMER PPP IA.FEU-MCNC: 0.95 MG/L FEU
DIFFERENTIAL METHOD: ABNORMAL
EOSINOPHIL # BLD AUTO: 0.4 K/UL (ref 0–0.5)
EOSINOPHIL NFR BLD: 4.2 % (ref 0–8)
ERYTHROCYTE [DISTWIDTH] IN BLOOD BY AUTOMATED COUNT: 13.2 % (ref 11.5–14.5)
EST. GFR  (NO RACE VARIABLE): >60 ML/MIN/1.73 M^2
GLUCOSE SERPL-MCNC: 74 MG/DL (ref 70–110)
GLUCOSE UR QL STRIP: NEGATIVE
HCT VFR BLD AUTO: 45.2 % (ref 37–48.5)
HGB BLD-MCNC: 15 G/DL (ref 12–16)
HGB UR QL STRIP: ABNORMAL
IMM GRANULOCYTES # BLD AUTO: 0.15 K/UL (ref 0–0.04)
IMM GRANULOCYTES NFR BLD AUTO: 1.5 % (ref 0–0.5)
INFLUENZA A, MOLECULAR: NEGATIVE
INFLUENZA B, MOLECULAR: NEGATIVE
KETONES UR QL STRIP: NEGATIVE
LACTATE SERPL-SCNC: 2.1 MMOL/L (ref 0.5–2.2)
LEUKOCYTE ESTERASE UR QL STRIP: ABNORMAL
LYMPHOCYTES # BLD AUTO: 2 K/UL (ref 1–4.8)
LYMPHOCYTES NFR BLD: 20.3 % (ref 18–48)
MCH RBC QN AUTO: 29.1 PG (ref 27–31)
MCHC RBC AUTO-ENTMCNC: 33.2 G/DL (ref 32–36)
MCV RBC AUTO: 88 FL (ref 82–98)
MICROSCOPIC COMMENT: ABNORMAL
MONOCYTES # BLD AUTO: 0.7 K/UL (ref 0.3–1)
MONOCYTES NFR BLD: 7 % (ref 4–15)
NEUTROPHILS # BLD AUTO: 6.6 K/UL (ref 1.8–7.7)
NEUTROPHILS NFR BLD: 66.2 % (ref 38–73)
NITRITE UR QL STRIP: NEGATIVE
NRBC BLD-RTO: 0 /100 WBC
PH UR STRIP: 7 [PH] (ref 5–8)
PLATELET # BLD AUTO: 284 K/UL (ref 150–450)
PLATELET BLD QL SMEAR: ABNORMAL
PMV BLD AUTO: 10.4 FL (ref 9.2–12.9)
POTASSIUM SERPL-SCNC: 4.3 MMOL/L (ref 3.5–5.1)
PROT SERPL-MCNC: 8 G/DL (ref 6–8.4)
PROT UR QL STRIP: NEGATIVE
RBC # BLD AUTO: 5.15 M/UL (ref 4–5.4)
RBC #/AREA URNS HPF: 1 /HPF (ref 0–4)
SARS-COV-2 RDRP RESP QL NAA+PROBE: NEGATIVE
SODIUM SERPL-SCNC: 138 MMOL/L (ref 136–145)
SP GR UR STRIP: 1.02 (ref 1–1.03)
SPECIMEN SOURCE: NORMAL
SQUAMOUS #/AREA URNS HPF: 10 /HPF
TROPONIN I SERPL DL<=0.01 NG/ML-MCNC: <0.006 NG/ML (ref 0–0.03)
URN SPEC COLLECT METH UR: ABNORMAL
UROBILINOGEN UR STRIP-ACNC: NEGATIVE EU/DL
WBC # BLD AUTO: 9.91 K/UL (ref 3.9–12.7)
WBC #/AREA URNS HPF: 2 /HPF (ref 0–5)

## 2023-09-25 PROCEDURE — 99285 EMERGENCY DEPT VISIT HI MDM: CPT | Mod: 25

## 2023-09-25 PROCEDURE — 25500020 PHARM REV CODE 255: Performed by: SURGERY

## 2023-09-25 PROCEDURE — 93010 EKG 12-LEAD: ICD-10-PCS | Mod: ,,, | Performed by: INTERNAL MEDICINE

## 2023-09-25 PROCEDURE — U0002 COVID-19 LAB TEST NON-CDC: HCPCS | Performed by: SURGERY

## 2023-09-25 PROCEDURE — 96361 HYDRATE IV INFUSION ADD-ON: CPT

## 2023-09-25 PROCEDURE — 83605 ASSAY OF LACTIC ACID: CPT | Performed by: SURGERY

## 2023-09-25 PROCEDURE — 93005 ELECTROCARDIOGRAM TRACING: CPT

## 2023-09-25 PROCEDURE — 99900035 HC TECH TIME PER 15 MIN (STAT)

## 2023-09-25 PROCEDURE — 87502 INFLUENZA DNA AMP PROBE: CPT | Performed by: SURGERY

## 2023-09-25 PROCEDURE — 82550 ASSAY OF CK (CPK): CPT | Performed by: SURGERY

## 2023-09-25 PROCEDURE — 93010 ELECTROCARDIOGRAM REPORT: CPT | Mod: ,,, | Performed by: INTERNAL MEDICINE

## 2023-09-25 PROCEDURE — 83880 ASSAY OF NATRIURETIC PEPTIDE: CPT | Performed by: SURGERY

## 2023-09-25 PROCEDURE — 81000 URINALYSIS NONAUTO W/SCOPE: CPT | Performed by: SURGERY

## 2023-09-25 PROCEDURE — 36415 COLL VENOUS BLD VENIPUNCTURE: CPT | Performed by: SURGERY

## 2023-09-25 PROCEDURE — 80053 COMPREHEN METABOLIC PANEL: CPT | Performed by: SURGERY

## 2023-09-25 PROCEDURE — 85379 FIBRIN DEGRADATION QUANT: CPT | Performed by: SURGERY

## 2023-09-25 PROCEDURE — 84484 ASSAY OF TROPONIN QUANT: CPT | Performed by: SURGERY

## 2023-09-25 PROCEDURE — 87040 BLOOD CULTURE FOR BACTERIA: CPT | Performed by: SURGERY

## 2023-09-25 PROCEDURE — 96365 THER/PROPH/DIAG IV INF INIT: CPT

## 2023-09-25 PROCEDURE — 25000003 PHARM REV CODE 250: Performed by: SURGERY

## 2023-09-25 PROCEDURE — 85025 COMPLETE CBC W/AUTO DIFF WBC: CPT | Performed by: SURGERY

## 2023-09-25 PROCEDURE — 63600175 PHARM REV CODE 636 W HCPCS: Performed by: SURGERY

## 2023-09-25 RX ORDER — LEVOFLOXACIN 500 MG/1
500 TABLET, FILM COATED ORAL DAILY
Qty: 7 TABLET | Refills: 0 | Status: SHIPPED | OUTPATIENT
Start: 2023-09-25 | End: 2023-10-02

## 2023-09-25 RX ORDER — LEVOFLOXACIN 5 MG/ML
500 INJECTION, SOLUTION INTRAVENOUS
Status: COMPLETED | OUTPATIENT
Start: 2023-09-25 | End: 2023-09-25

## 2023-09-25 RX ORDER — LEVOFLOXACIN 5 MG/ML
INJECTION, SOLUTION INTRAVENOUS
Status: DISCONTINUED
Start: 2023-09-25 | End: 2023-09-25 | Stop reason: HOSPADM

## 2023-09-25 RX ORDER — DOCUSATE SODIUM 100 MG/1
100 CAPSULE, LIQUID FILLED ORAL
Status: COMPLETED | OUTPATIENT
Start: 2023-09-25 | End: 2023-09-25

## 2023-09-25 RX ORDER — DOCUSATE SODIUM 100 MG/1
100 CAPSULE, LIQUID FILLED ORAL 2 TIMES DAILY PRN
Qty: 30 CAPSULE | Refills: 0 | Status: SHIPPED | OUTPATIENT
Start: 2023-09-25

## 2023-09-25 RX ADMIN — SODIUM CHLORIDE 1000 ML: 9 INJECTION, SOLUTION INTRAVENOUS at 05:09

## 2023-09-25 RX ADMIN — LEVOFLOXACIN 500 MG: 500 INJECTION, SOLUTION INTRAVENOUS at 07:09

## 2023-09-25 RX ADMIN — IOHEXOL 75 ML: 350 INJECTION, SOLUTION INTRAVENOUS at 06:09

## 2023-09-25 RX ADMIN — DOCUSATE SODIUM 100 MG: 100 CAPSULE, LIQUID FILLED ORAL at 07:09

## 2023-09-25 RX ADMIN — SODIUM CHLORIDE 1000 ML: 9 INJECTION, SOLUTION INTRAVENOUS at 03:09

## 2023-09-25 NOTE — ED PROVIDER NOTES
Encounter Date: 9/25/2023       History     Chief Complaint   Patient presents with    Nausea     Pt arrives to the ed with c/o dizziness and weak x 4 days. Appendectomy was done 9/21/23 by Dr. Rodriguez.     Loulou Galvez is a 43 y.o. female that presents with nausea & weakness x4 days  Patient had a laparoscopic appendectomy 4 days ago on September 21st 2023  Uneventful laparoscopic appendectomy, on daily pain medication after discharge   Patient reports being constipated, weak & dizzy with decreased oral intake today  Patient has been having hard bowel movements but takes Norco around the clock  Patient has no obvious abdominal pain, family just reports overall fatigue, no fever    The history is provided by the patient. The history is limited by a language barrier. A  was used (MICHELLE hand).     Review of patient's allergies indicates:  No Known Allergies  No past medical history on file.  Past Surgical History:   Procedure Laterality Date    LAPAROSCOPIC APPENDECTOMY N/A 9/21/2023    Procedure: APPENDECTOMY, LAPAROSCOPIC;  Surgeon: Julio Rodriguez MD;  Location: [facility] OR;  Service: General;  Laterality: N/A;     No family history on file.  Social History     Tobacco Use    Smoking status: Never    Smokeless tobacco: Never     Review of Systems   Constitutional:  Positive for fatigue.   HENT: Negative.     Eyes: Negative.    Respiratory: Negative.     Cardiovascular: Negative.    Gastrointestinal:  Positive for constipation and nausea.   Genitourinary: Negative.    Musculoskeletal: Negative.    Skin: Negative.    Neurological:  Positive for dizziness.   Psychiatric/Behavioral: Negative.         Physical Exam     Initial Vitals [09/25/23 1500]   BP Pulse Resp Temp SpO2   102/70 84 18 98 °F (36.7 °C) 96 %      MAP       --         Physical Exam    Nursing note and vitals reviewed.  Constitutional: Vital signs are normal. She appears well-developed and well-nourished. She is cooperative.    HENT:   Head: Normocephalic and atraumatic.   Eyes: Conjunctivae, EOM and lids are normal. Pupils are equal, round, and reactive to light.   Neck: Trachea normal and phonation normal. Neck supple. No JVD present.   Normal range of motion.   Full passive range of motion without pain.     Cardiovascular:  Normal rate, regular rhythm, S1 normal, S2 normal, normal heart sounds, intact distal pulses and normal pulses.           Pulmonary/Chest: Effort normal and breath sounds normal.   Abdominal: Abdomen is soft and flat. Bowel sounds are normal.   Musculoskeletal:         General: Normal range of motion.      Cervical back: Full passive range of motion without pain, normal range of motion and neck supple.     Neurological: She is alert and oriented to person, place, and time. She has normal strength.   Skin: Skin is warm, dry and intact. Capillary refill takes less than 2 seconds.         ED Course   Procedures  Labs Reviewed   COMPREHENSIVE METABOLIC PANEL - Abnormal; Notable for the following components:       Result Value    Alkaline Phosphatase 54 (*)     All other components within normal limits   CBC W/ AUTO DIFFERENTIAL - Abnormal; Notable for the following components:    Immature Granulocytes 1.5 (*)     Immature Grans (Abs) 0.15 (*)     All other components within normal limits   D DIMER, QUANTITATIVE - Abnormal; Notable for the following components:    D-Dimer 0.95 (*)     All other components within normal limits   URINALYSIS, REFLEX TO URINE CULTURE - Abnormal; Notable for the following components:    Occult Blood UA Trace (*)     Leukocytes, UA 1+ (*)     All other components within normal limits    Narrative:     Specimen Source->Urine   URINALYSIS MICROSCOPIC - Abnormal; Notable for the following components:    Bacteria Few (*)     All other components within normal limits    Narrative:     Specimen Source->Urine   INFLUENZA A & B BY MOLECULAR   CULTURE, BLOOD   CULTURE, BLOOD   TROPONIN I   CK   B-TYPE  NATRIURETIC PEPTIDE   SARS-COV-2 RNA AMPLIFICATION, QUAL   LACTIC ACID, PLASMA     EKG Readings: (Independently Interpreted)   No STEMI  Normal sinus rhythm  No ectopy  Normal conduction  Normal ST segments  Normal T-wave  Normal axis  Heart rate in the 70s       Imaging Results              X-Ray Abdomen Flat And Erect (Final result)  Result time 09/25/23 18:39:56      Final result by Yolie Saunders MD (09/25/23 18:39:56)                   Impression:      Stool is seen throughout the colon which may be seen with constipation.      Electronically signed by: Yolie Saunders MD  Date:    09/25/2023  Time:    18:39               Narrative:    EXAMINATION:  XR ABDOMEN FLAT AND ERECT    CLINICAL HISTORY:  Constipation (564.00);    TECHNIQUE:  Flat and erect AP views of the abdomen were performed.    COMPARISON:  None    FINDINGS:  There is no evidence bowel obstruction.  Stool is seen throughout the colon.  Contrast is seen within the renal collecting systems.  The osseous structures are unremarkable.                                       CTA Chest Non-Coronary (PE Studies) (Final result)  Result time 09/25/23 18:17:46      Final result by Yolie Saunders MD (09/25/23 18:17:46)                   Impression:      There is no evidence pulmonary embolus.  There are small scattered regions of consolidation possibly representing developing pneumonia.      Electronically signed by: Yolie Saunders MD  Date:    09/25/2023  Time:    18:17               Narrative:    EXAMINATION:  CTA CHEST NON CORONARY (PE STUDIES)    CLINICAL HISTORY:  Pulmonary embolism (PE) suspected, positive D-dimer;    TECHNIQUE:  Low dose axial images, sagittal and coronal reformations were obtained from the thoracic inlet to the lung bases following the IV administration of 100 mL of Omnipaque 350.  Contrast timing was optimized to evaluate the pulmonary arteries.  MIP images were performed.    COMPARISON:  None    FINDINGS:  There are no  pulmonary arterial filling defects.  There is no pneumothorax, pleural effusion.  There is bibasilar dependent atelectasis and small scattered regions of consolidation.  Visualized portions of the superior abdomen are unremarkable.  The osseous structures are unremarkable.                                       X-Ray Chest 1 View (Final result)  Result time 09/25/23 15:30:12      Final result by Lashawn Rosenthal MD (09/25/23 15:30:12)                   Impression:      No acute cardiopulmonary abnormality.      Electronically signed by: Lashawn Rosenthal  Date:    09/25/2023  Time:    15:30               Narrative:    EXAMINATION:  XR CHEST 1 VIEW    CLINICAL HISTORY:  fatigue;    TECHNIQUE:  Single view of the chest was obtained.    COMPARISON:  None    FINDINGS:  Normal cardiomediastinal contour. No focal consolidation, pleural effusion or pneumothorax.                                       Medications   levoFLOXacin 500 mg/100 mL IVPB 500 mg (has no administration in time range)   docusate sodium capsule 100 mg (has no administration in time range)   sodium chloride 0.9% bolus 1,000 mL 1,000 mL (0 mLs Intravenous Stopped 9/25/23 1702)   sodium chloride 0.9% bolus 1,000 mL 1,000 mL (1,000 mLs Intravenous New Bag 9/25/23 1708)   iohexoL (OMNIPAQUE 350) injection 75 mL (75 mLs Intravenous Given 9/25/23 1815)       Medical Decision Making  Postop day 4 from laparoscopic appendectomy with fatigue this evening  Fatigue & constipation, no fever, no dysuria on ER evaluation this evening  Patient has had lack of energy, not feeling well after appendectomy surgery    Differential Diagnosis  Constipation, UTI, pneumonia, atelectasis, pulmonary embolism  Dehydration, bowel obstruction    Problems Addressed:  Abnormal chest x-ray: complicated acute illness or injury  Constipation, unspecified constipation type: complicated acute illness or injury  Dehydration: complicated acute illness or injury  Fatigue: complicated acute illness  or injury    Amount and/or Complexity of Data Reviewed  Labs: ordered. Decision-making details documented in ED Course.  Radiology: ordered and independent interpretation performed.  ECG/medicine tests: ordered and independent interpretation performed.    ED Management & Risk of Complications, Morbidity, Mortality:  EKG within normal limits with a clear chest x-ray in the ER today  Normal white count with no obvious signs of urinary tract infection today  Normal lab work including normal troponin in the emergency room   Mild elevation of D-dimer with a (-) CT PE study on assessment today  CT PE study did show possible early onset pneumonia per report given  X-ray of the abdomen showed constipation with no signs of bowel obstruction    IV fluids given the ER with IV Levaquin possible pneumonia, Rx on DC  Blood culture sent with a normal lactic acid, feeling better after IV fluids  Will follow-up with general surgery clinic with Dr. Rodriguez in 48 hours  Hydrate, fiber diet, Colace prescribed for constipation on ER discharge  Avoid Norco when possible return with any concerns on discharge      Clinical Impression:   Final diagnoses:  [R53.83] Fatigue  [E86.0] Dehydration (Primary)  [K59.00] Constipation, unspecified constipation type  [R93.89] Abnormal chest x-ray        ED Disposition Condition    Discharge Stable          ED Prescriptions       Medication Sig Dispense Start Date End Date Auth. Provider    levoFLOXacin (LEVAQUIN) 500 MG tablet Take 1 tablet (500 mg total) by mouth once daily. for 7 days 7 tablet 9/25/2023 10/2/2023 Rojelio Dubois MD    docusate sodium (COLACE) 100 MG capsule Take 1 capsule (100 mg total) by mouth 2 (two) times daily as needed for Constipation. 30 capsule 9/25/2023 -- Rojelio Dubois MD          Follow-up Information       Follow up With Specialties Details Why Contact Info    Julio Rodriguez MD General Surgery Go in 2 days KEEP APPOINTMENT [address]  [address]  [place]  [address]  [phone]      Maria Luz Barrera, NP Family Medicine Schedule an appointment as soon as possible for a visit in 2 days  [id number] [facility] [address]  [phone]               Rojelio Dubois MD  09/25/23 [id number]

## 2023-09-25 NOTE — ED NOTES
Pt states that she had appendectomy on Thursday. She is complaining on nausea and a little bit of weakness today.   States she has been able to eat and drink fine at home.   Only taking pain meds PRN

## 2023-09-26 LAB
BACTERIA BLD CULT: NORMAL
BACTERIA BLD CULT: NORMAL
FINAL PATHOLOGIC DIAGNOSIS: NORMAL
GROSS: NORMAL
Lab: NORMAL

## 2023-10-01 LAB
BACTERIA BLD CULT: NORMAL
BACTERIA BLD CULT: NORMAL

## 2023-10-03 NOTE — DISCHARGE SUMMARY
D/C Summary  Pt had emergency appendectomy for acute appendicitis. Did well post op and discharged on regular diet to see me in one week. Rx for Tramadol given and instructions for wound care.

## (undated) DEVICE — APPLIER CLIP EPIX UNIV 5X34

## (undated) DEVICE — GLOVE 8 PROTEXIS PI BLUE

## (undated) DEVICE — STAPLER INT LINEAR ARTC 3.5-45

## (undated) DEVICE — Device

## (undated) DEVICE — CART STAPLE FLEX ETX 3.5MM BLU

## (undated) DEVICE — CANNULA LAP SEAL Z THRD 5X100

## (undated) DEVICE — GLOVE PROTEXIS LTX MICRO  7.5

## (undated) DEVICE — SOL WATER STRL IRR 1000ML

## (undated) DEVICE — CART STAPLE RELD 45MM WHT

## (undated) DEVICE — NDL HYPO REG 25G X 1 1/2

## (undated) DEVICE — NDL ECLIPSE SAFETY 18GX1-1/2IN

## (undated) DEVICE — TROCAR KII FIOS ZTHREAD 12X100

## (undated) DEVICE — SUT 0 VICRYL / UR6 (J603)

## (undated) DEVICE — TRAY CATH FOL SIL URIMTR 16FR

## (undated) DEVICE — ADHESIVE DERMABOND ADVANCED

## (undated) DEVICE — APPLICATOR CHLORAPREP ORN 26ML

## (undated) DEVICE — SUT MONOCYRL 4-0 PS2 UND

## (undated) DEVICE — PAD UNDERPAD 30X30

## (undated) DEVICE — GLOVE BIOGEL 7.5

## (undated) DEVICE — SCISSOR 5MMX35CM DIRECT DRIVE

## (undated) DEVICE — IRRIGATOR ENDOSCOPY DISP.

## (undated) DEVICE — TROCAR ENDO Z THREAD KII 5X100

## (undated) DEVICE — SYR 10CC LUER LOCK

## (undated) DEVICE — BAG TISS RETRV MONARCH 10MM

## (undated) DEVICE — DEVICE CLOSURE DISP 14G